# Patient Record
Sex: FEMALE | Race: WHITE | URBAN - METROPOLITAN AREA
[De-identification: names, ages, dates, MRNs, and addresses within clinical notes are randomized per-mention and may not be internally consistent; named-entity substitution may affect disease eponyms.]

---

## 2018-09-24 ENCOUNTER — TRANSFERRED RECORDS (OUTPATIENT)
Dept: HEALTH INFORMATION MANAGEMENT | Facility: CLINIC | Age: 73
End: 2018-09-24

## 2018-09-24 ENCOUNTER — MEDICAL CORRESPONDENCE (OUTPATIENT)
Dept: HEALTH INFORMATION MANAGEMENT | Facility: CLINIC | Age: 73
End: 2018-09-24

## 2018-11-06 ENCOUNTER — OFFICE VISIT (OUTPATIENT)
Dept: SLEEP MEDICINE | Facility: CLINIC | Age: 73
End: 2018-11-06
Payer: COMMERCIAL

## 2018-11-06 VITALS
BODY MASS INDEX: 22.26 KG/M2 | DIASTOLIC BLOOD PRESSURE: 84 MMHG | HEIGHT: 62 IN | WEIGHT: 121 LBS | OXYGEN SATURATION: 98 % | SYSTOLIC BLOOD PRESSURE: 154 MMHG | HEART RATE: 97 BPM | TEMPERATURE: 97.9 F | RESPIRATION RATE: 14 BRPM

## 2018-11-06 DIAGNOSIS — F51.04 CHRONIC INSOMNIA: Primary | ICD-10-CM

## 2018-11-06 PROCEDURE — 90791 PSYCH DIAGNOSTIC EVALUATION: CPT | Performed by: PSYCHOLOGIST

## 2018-11-06 RX ORDER — BRIMONIDINE TARTRATE 1.5 MG/ML
SOLUTION/ DROPS OPHTHALMIC
Refills: 1 | COMMUNITY
Start: 2018-08-20

## 2018-11-06 RX ORDER — TRAVOPROST OPHTHALMIC SOLUTION 0.04 MG/ML
SOLUTION OPHTHALMIC
COMMUNITY
Start: 2016-06-23

## 2018-11-06 RX ORDER — SUMATRIPTAN 100 MG/1
TABLET, FILM COATED ORAL
Refills: 1 | COMMUNITY
Start: 2018-08-17

## 2018-11-06 RX ORDER — MELATONIN 5 MG
5 TABLET,CHEWABLE ORAL
COMMUNITY
Start: 2018-10-24

## 2018-11-06 RX ORDER — SERTRALINE HYDROCHLORIDE 25 MG/1
TABLET, FILM COATED ORAL
Refills: 0 | COMMUNITY
Start: 2018-10-24

## 2018-11-06 RX ORDER — CEFUROXIME AXETIL 250 MG/1
TABLET ORAL
Refills: 0 | COMMUNITY
Start: 2018-02-13

## 2018-11-06 RX ORDER — TIMOLOL MALEATE 5 MG/ML
1 SOLUTION/ DROPS OPHTHALMIC
COMMUNITY
Start: 2018-08-17

## 2018-11-06 ASSESSMENT — ANXIETY QUESTIONNAIRES
5. BEING SO RESTLESS THAT IT IS HARD TO SIT STILL: NOT AT ALL
7. FEELING AFRAID AS IF SOMETHING AWFUL MIGHT HAPPEN: NOT AT ALL
2. NOT BEING ABLE TO STOP OR CONTROL WORRYING: NOT AT ALL
3. WORRYING TOO MUCH ABOUT DIFFERENT THINGS: NOT AT ALL
1. FEELING NERVOUS, ANXIOUS, OR ON EDGE: MORE THAN HALF THE DAYS
6. BECOMING EASILY ANNOYED OR IRRITABLE: NOT AT ALL
GAD7 TOTAL SCORE: 2

## 2018-11-06 ASSESSMENT — PATIENT HEALTH QUESTIONNAIRE - PHQ9
5. POOR APPETITE OR OVEREATING: NOT AT ALL
SUM OF ALL RESPONSES TO PHQ QUESTIONS 1-9: 7

## 2018-11-06 NOTE — PATIENT INSTRUCTIONS
Continue with your current sleep schedule of 10:30 PM and out of bed by 6 AM with alarm.  If you wake in middle of the night and cant return to sleep within 20-30 minutes, sit up in bed and do something relaxing like reading a book until you feel sleepy and see if you fall asleep again.    Avoid laying in bed after your alarm.  Get up and get going with your day ensuring you have bright lights on in your home.  Move around a bit.  This behavioral activation is good for sleep and mood and helps set the clock for sleep.    Continue your melatonin and sertraline as prescribed by your physician.    Good Night Mind, Sachi Jacques and Ronel Cisse    Or download CBT-I  which has a lot of excellent applications and strategies to help you sleep.

## 2018-11-06 NOTE — NURSING NOTE
"Chief Complaint   Patient presents with     Sleep Problem     Insomnia        Initial /81  Pulse 97  Temp 97.9  F (36.6  C) (Oral)  Resp 14  Ht 1.562 m (5' 1.5\")  Wt 54.9 kg (121 lb)  SpO2 98%  BMI 22.49 kg/m2 Estimated body mass index is 22.49 kg/(m^2) as calculated from the following:    Height as of this encounter: 1.562 m (5' 1.5\").    Weight as of this encounter: 54.9 kg (121 lb).    Medication Reconciliation: complete    Neck circumference: 13.5 inches / 34 centimeters.    ESS 3    Jessica Campbell MA      "

## 2018-11-06 NOTE — PROGRESS NOTES
SLEEP MEDICINE CONSULTATION  Sleep Psychology    Name: Katelynn Gaitan MRN# [id number]   Age: 73 year old YOB: 1945     Date of Consultation: Nov 6, 2018  Consultation is requested by: No referring provider defined for this encounter.  Primary care provider: No Ref-Primary, Physician    Reason for Sleep Consultation     Katelynn Gaitan is a 73 year old female seen today for a behavioral sleep medicine consultation because of insomnia.      Assessment and Plan     Sleep Diagnoses/Recommendations:    1. Chronic insomnia  Patient insomnia appears to be chronic and likely masked by long-standing use of amitriptyline for treatment of migraine headaches.  Trigger for acute insomnia related to discontinuation of amitriptyline about 6 months ago.  Patient is reporting mild symptoms of depression for which she has been started on sertraline at low dose.  Her internist also prescribed 5 mg time release melatonin which she states is helping with her primary complaint of sleep maintenance insomnia.  Patient reports that she benefited from sleep health education today and review of sleep hygiene.  She is not interested in structured cognitive behavioral therapy for insomnia.  Her 2 weeks of sleep diary now suggested overall sleep efficiency is above 85% and she is getting 1.5 hours more sleep than at her baseline about a month ago.  She may follow-up as needed for further instructions/counseling around behavioral strategies to manage insomnia.  She was also provided information about aging and sleep.    See patient instructions for initial treatment recommendations and behavioral sleep plan.    Summary Counseling:      Katelynn was provided information about the pathophysiology of insomnia and psychophysiological factors contributing to the onset and maintenance of insomnia .  Treatment option were discussed including component of cognitive-behavioral therapy for insomnia. The benefits and potential  early side effects of treatment including increased daytime sleepiness were discussed. She was advised to seek medical advise and consultation around use of or changes to prescription sleep medication, Patient was counseled on the importance of avoiding driving if drowsy.        Follow-up:   as needed.  At this point patient feels consultation is sufficient for her to continue her progress and improving sleep.     History of Present Sleep Complaint     Katelynn Gaitan is a 73 year old year old female who reports a 20+ year history of insomnia associated with chronic migraine headaches.  She states that when amitriptyline was introduced for treatment of migraines her sleep also improved.  The medication was discontinued about 6 months ago due to concerns around side effects and dizziness.  It was then in the setting of the some stressors as well that insomnia reemerged.  She indicates that her internist suggested that she may also have depression.  She was started on a very low dose of sertraline-12.5 mg and is now up to 25 mg.  She was also started on 5 mg time release melatonin which she reports is helping her achieve significantly improved sleep.    Patient states that she goes to bed at about 10:30 PM and usually falls asleep within minutes.  She now is sleeping at least 2-3 hours before awakening.  She is usually able to fall back to sleep within 5-15 minutes.  She will then awakened 1-2 times and again fall back to sleep within 5-10 minutes.  Total average wake time after sleep onset is approximately 30-40 minutes.  She states that her average total sleep time is approximately 6.5 hours a significant improvement over about a month ago.    She drinks coffee in the morning but otherwise does not use caffeine after that.  She does have a television in the bedroom but rarely uses it there.  She does use her iPhone and computer close to bedtime.  In the middle the night she may start worrying and ruminating.   "She tries to \"shift my mindset\" and does relaxation exercises to try to fall back to sleep.  With introduction of the melatonin she finds she needs to do that much less.    .  Bed space is quite comfortableNo evening use of alcohol, no use of recreational drugs    Previous Sleep Studies:    Previous sleep study completed at [facility] did not reveal any evidence of sleep disordered breathing or other intrinsic sleep disorder.        Screening          Babbitt Sleepiness Scale  Total score - Babbitt: 3 .    LORIN Total Score: 10       PHQ-9 SCORE 11/6/2018   Total Score 7       ADOLFO-7 SCORE 11/6/2018   Total Score 2       Patient Activation Score   No flowsheet data found.      Vitals     /84  Pulse 97  Temp 97.9  F (36.6  C) (Oral)  Resp 14  Ht 1.562 m (5' 1.5\")  Wt 54.9 kg (121 lb)  SpO2 98%  BMI 22.49 kg/m2     Medical History     No past medical history on file.    Current Outpatient Prescriptions   Medication Sig Dispense Refill     brimonidine (ALPHAGAN-P) 0.15 % ophthalmic solution   1     Cholecalciferol (VITAMIN D3) 1000 units CAPS Take 1,000 Units by mouth       Melatonin 5 MG CHEW Take 5 mg by mouth       sertraline (ZOLOFT) 25 MG tablet   0     SUMAtriptan (IMITREX STATDOSE) 6 MG/0.5ML pen injector kit   0     SUMAtriptan (IMITREX) 100 MG tablet   1     timolol (TIMOPTIC) 0.5 % ophthalmic solution Apply 1 drop to eye       travoprost, ALLISON Free, (TRAVATAN Z) 0.004 % ophthalmic solution Instill 1 drop in both eyes at bedtime         No past surgical history on file.     No Known Allergies      Psychiatric History     Prior Psychiatric Diagnoses: yes, depression   Psychiatric Hospitalizations: none   Use of Psychotropics yes, sertraline, 25 mg      Chemical Use     Prior Chemical Dependency Treatment: none         Family History     No family history on file.    Sleep Disorders: None reported    Social History     Social History     Social History     Marital status: Single     " Spouse name: N/A     Number of children: N/A     Years of education: N/A     Social History Main Topics     Smoking status: Never Smoker     Smokeless tobacco: Never Used     Alcohol use None     Drug use: None     Sexual activity: Not Asked     Other Topics Concern     None     Social History Narrative     None         None reported  Mental Status Examination     Katelynn presented as appropriately dressed and groomed and was oriented X3 with speech language intact.  The patient was cooperative throughout the evaluation with no signs of hallucinations, delusions, loosening of associations or other thought disturbance.  Mood was stable.  Affect was congruent with mood. Insight and judgement we intact.  Memory was intact for recent and remote elements.  There was no report of suicidal ideation, intention or plan. Attention and concentration were within normal.      Time Spent: 50 minutes    Copy:   No Ref-Primary, Physician               No referring provi stable Alfredo defined for this encounter.    Donavon Frye PsyD, LP, DBSM  Certified, Behavioral Sleep Medicine  [facility] -  [place] and [place]

## 2018-11-07 ASSESSMENT — ANXIETY QUESTIONNAIRES: GAD7 TOTAL SCORE: 2

## 2022-12-06 NOTE — MR AVS SNAPSHOT
After Visit Summary   11/6/2018    Katelynn Gaitan    MRN: [id number]           Patient Information     Date Of Birth          1945        Visit Information        Provider Department      11/6/2018 12:30 PM Donavon Frye PsyD [facility]        Care Instructions    Continue with your current sleep schedule of 10:30 PM and out of bed by 6 AM with alarm.  If you wake in middle of the night and cant return to sleep within 20-30 minutes, sit up in bed and do something relaxing like reading a book until you feel sleepy and see if you fall asleep again.    Avoid laying in bed after your alarm.  Get up and get going with your day ensuring you have bright lights on in your home.  Move around a bit.  This behavioral activation is good for sleep and mood and helps set the clock for sleep.    Continue your melatonin and sertraline as prescribed by your physician.    Good Night Mind, Sachi Jacques and Ronel Cisse    Or download CBT-I  which has a lot of excellent applications and strategies to help you sleep.                  Follow-ups after your visit        Follow-up notes from your care team     Return if symptoms worsen or fail to improve, for Insomnia Follow up.      Who to contact     If you have questions or need follow up information about today's clinic visit or your schedule please contact [facility] directly at [phone].  Normal or non-critical lab and imaging results will be communicated to you by MyChart, letter or phone within 4 business days after the clinic has received the results. If you do not hear from us within 7 days, please contact the clinic through MyChart or phone. If you have a critical or abnormal lab result, we will notify you by phone as soon as possible.  Submit refill requests through better. or call your pharmacy and they will forward the refill request to us. Please allow 3 business days for your refill to be  "completed.          Additional Information About Your Visit        [employer] Information     [employer] lets you send messages to your doctor, view your test results, renew your prescriptions, schedule appointments and more. To sign up, go to [email or web address] . Click on \"Log in\" on the left side of the screen, which will take you to the Welcome page. Then click on \"Sign up Now\" on the right side of the page.     You will be asked to enter the access code listed below, as well as some personal information. Please follow the directions to create your username and password.     Your access code is: [email or web address]  Expires: 2019  1:23 PM     Your access code will  in 90 days. If you need help or a new code, please call your [place] clinic or [phone].        Care EveryWhere ID     This is your Care EveryWhere ID. This could be used by other organizations to access your [place] medical records  [id number]        Your Vitals Were     Pulse Temperature Respirations Height Pulse Oximetry BMI (Body Mass Index)    97 97.9  F (36.6  C) (Oral) 14 1.562 m (5' 1.5\") 98% 22.49 kg/m2       Blood Pressure from Last 3 Encounters:   18 154/84    Weight from Last 3 Encounters:   18 54.9 kg (121 lb)              Today, you had the following     No orders found for display       Primary Care Provider Fax #    Physician No Ref-Primary [phone]       No address on file        Equal Access to Services     [facility] : Hadii aad ku hadasho Soserinaali, waaxda luqadaha, qaybta kaalmada adeegyada, krista fam . So [facility] [phone].    ATENCIÓN: Si habla español, tiene a gya disposición servicios gratuitos de asistencia lingüística. Llame al [phone].    We comply with applicable federal civil rights laws and [place] laws. We do not discriminate on the basis of race, color, national origin, age, disability, sex, sexual orientation, or gender identity.            Thank you!     " Thank you for choosing [facility]  for your care. Our goal is always to provide you with excellent care. Hearing back from our patients is one way we can continue to improve our services. Please take a few minutes to complete the written survey that you may receive in the mail after your visit with us. Thank you!             Your Updated Medication List - Protect others around you: Learn how to safely use, store and throw away your medicines at www.disposemymeds.org.          This list is accurate as of 11/6/18  1:23 PM.  Always use your most recent med list.                   Brand Name Dispense Instructions for use Diagnosis    brimonidine 0.15 % ophthalmic solution    ALPHAGAN-P          Melatonin 5 MG Chew      Take 5 mg by mouth        sertraline 25 MG tablet    ZOLOFT          * SUMAtriptan 6 MG/0.5ML pen injector kit    IMITREX STATDOSE          * SUMAtriptan 100 MG tablet    IMITREX          timolol 0.5 % ophthalmic solution    TIMOPTIC     Apply 1 drop to eye        TRAVATAN Z 0.004 % ophthalmic solution   Generic drug:  travoprost (ALLISON Free)      Instill 1 drop in both eyes at bedtime        vitamin D3 1000 units Caps      Take 1,000 Units by mouth        * Notice:  This list has 2 medication(s) that are the same as other medications prescribed for you. Read the directions carefully, and ask your doctor or other care provider to review them with you.       without difficulty